# Patient Record
Sex: FEMALE | Race: BLACK OR AFRICAN AMERICAN | NOT HISPANIC OR LATINO | Employment: UNEMPLOYED | URBAN - METROPOLITAN AREA
[De-identification: names, ages, dates, MRNs, and addresses within clinical notes are randomized per-mention and may not be internally consistent; named-entity substitution may affect disease eponyms.]

---
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
## 2025-01-04 ENCOUNTER — HEALTH MAINTENANCE LETTER (OUTPATIENT)
Age: 20
End: 2025-01-04

## 2025-07-16 ENCOUNTER — TELEPHONE (OUTPATIENT)
Dept: FAMILY MEDICINE | Facility: CLINIC | Age: 20
End: 2025-07-16
Payer: COMMERCIAL

## 2025-07-16 NOTE — TELEPHONE ENCOUNTER
07/16/25    Spoke to Patient regarding excessive no-shows. Reviewed No-show policy and the importance of coming to their appointments. They have voiced understanding and have agreed to call or use their MyChart to cancel their appointments. They are aware if they continue to no-show appointments, they may only be eligible for same day appointments, if available.    Was MyChart offered to patient?  Yes, MyChart Activation code sent via text or email.     Was Text Reminders offered to patient?  Yes, Pt accepted text reminders.    Jenni Bhardwaj on 7/16/2025 at 4:18 PM

## 2025-07-17 ENCOUNTER — OFFICE VISIT (OUTPATIENT)
Dept: FAMILY MEDICINE | Facility: CLINIC | Age: 20
End: 2025-07-17
Payer: COMMERCIAL

## 2025-07-17 VITALS
BODY MASS INDEX: 48.48 KG/M2 | HEIGHT: 65 IN | OXYGEN SATURATION: 98 % | SYSTOLIC BLOOD PRESSURE: 103 MMHG | DIASTOLIC BLOOD PRESSURE: 68 MMHG | TEMPERATURE: 97.8 F | WEIGHT: 291 LBS | RESPIRATION RATE: 18 BRPM | HEART RATE: 92 BPM

## 2025-07-17 DIAGNOSIS — O00.201 RIGHT OVARIAN PREGNANCY WITHOUT INTRAUTERINE PREGNANCY: Primary | ICD-10-CM

## 2025-07-17 LAB — HCG INTACT+B SERPL-ACNC: 285 MIU/ML

## 2025-07-17 PROCEDURE — 84702 CHORIONIC GONADOTROPIN TEST: CPT

## 2025-07-17 PROCEDURE — 36415 COLL VENOUS BLD VENIPUNCTURE: CPT

## 2025-07-17 NOTE — PROGRESS NOTES
"Preceptor attestation:  Vital signs reviewed: /68   Pulse 92   Temp 97.8  F (36.6  C) (Oral)   Resp 18   Ht 1.651 m (5' 5\")   Wt 132 kg (291 lb)   LMP 05/14/2025 (Exact Date)   SpO2 98%   BMI 48.42 kg/m      Patient seen, evaluated, and discussed with the resident.  I verified the content of the note, which accurately reflects my assessment of the patient and the plan of care.    Supervising physician: Chloe Mendez MD  [facility]  "

## 2025-07-17 NOTE — PATIENT INSTRUCTIONS
-labs today  -repeat ultrasound in 2 weeks   -go to ER if you are having more belly pain, fevers, short of breath

## 2025-07-17 NOTE — PROGRESS NOTES
Assessment & Plan     There are no diagnoses linked to this encounter.        Subjective   Pamela flor is a 19 year old, presenting for the following health issues:  No chief complaint on file.         No data to display              HPI      19 year old female new to me with hx of obesity here for ED follow up. Presented  for abdominal pain. Ectopic in right ovary based on TVUS. Hcg 395 then. Undesired preg.     pt recently underwent elective  via suction D&C on  for . After procedure pt was told no fetal tissue was noted within uterus and precautions were given for ectopic pregnancy. She presented to ED  for belly pain.     Given methotrexate on .     HCG on 25 was 659. --> 395 on the .     Had some emesis day after methotrexate but this has subsided. A little sore from the injections. No abdominal pain, fever, bleeding, cramping.     No meds. LMP May 14. No hx ectopic in the past or abdominal surgery.     Review of Systems  Constitutional, HEENT, cardiovascular, pulmonary, gi and gu systems are negative, except as otherwise noted.      Objective    There were no vitals taken for this visit.  There is no height or weight on file to calculate BMI.  Physical Exam   {Exam List (Optional):254580}    {Diagnostic Test Results (Optional):386701}        Signed Electronically by: Carlos Gomez MD  {Email feedback regarding this note to [email or web address]   :[id number]}   "negative, except as otherwise noted.      Objective    /68   Pulse 92   Temp 97.8  F (36.6  C) (Oral)   Resp 18   Ht 1.651 m (5' 5\")   Wt 132 kg (291 lb)   LMP 05/14/2025 (Exact Date)   SpO2 98%   BMI 48.42 kg/m    Body mass index is 48.42 kg/m .  Physical Exam   GENERAL: alert and no distress  RESP: lungs clear to auscultation - no rales, rhonchi or wheezes  CV: regular rate and rhythm, normal S1 S2, no S3 or S4, no murmur, click or rub, no peripheral edema  ABDOMEN: soft, nontender, no hepatosplenomegaly, no masses and bowel sounds normal  MS: no gross musculoskeletal defects noted, no edema  SKIN: no suspicious lesions or rashes    Results for orders placed or performed in visit on 07/17/25   Beta-HCG Quantitative     Status: Abnormal   Result Value Ref Range    hCG Quantitative 285 (H) <5 mIU/mL             OB US 07/14/25  FINDINGS:   UTERUS: Uterus measures 7.3 x 4.2 x 3.7 cm. Endometrium measures 5 mm. No intrauterine gestational sac is identified. A small amount of fluid is seen within the endocervical canal (image 48).     RIGHT OVARY: There is an indeterminate, ovoid, centrally hypoechoic, echogenic structure in the right adnexa adjacent to the right ovary, measuring 1.0 x 1.6 x 1.2 cm (image 72). No associated internal or significant peripheral color Doppler flow.   LEFT OVARY: Normal.     Signed Electronically by: Carlos Gomez MD  {Email feedback regarding this note to [email or web address]   :[id number]}  "

## 2025-07-21 ENCOUNTER — RESULTS FOLLOW-UP (OUTPATIENT)
Dept: FAMILY MEDICINE | Facility: CLINIC | Age: 20
End: 2025-07-21

## 2025-07-21 ENCOUNTER — DOCUMENTATION ONLY (OUTPATIENT)
Dept: FAMILY MEDICINE | Facility: CLINIC | Age: 20
End: 2025-07-21

## 2025-07-21 DIAGNOSIS — O00.201 RIGHT OVARIAN PREGNANCY WITHOUT INTRAUTERINE PREGNANCY: Primary | ICD-10-CM

## 2025-07-21 NOTE — PROGRESS NOTES
Interprofessional Team Consultation Note     Requesting Provider: Dr. Tee    Consultants:  Behavioral Health: Dr. Marycruz (s)  Care Coordination: Justyn Reyna Shannon  PharmD: Pieter). Jerrod  Family Medicine Physicians: (s). Yash Tee    Identifying Data/Reason for Referral:  Patient Pamela Bland, preferred name Pamela bland, is 19 year old female who is cared for by Dr. Tee. Provider is requesting consultation related to development of care plan. Relevant clinical information obtained from requesting provider, interprofessional team members noted above, and review of the medical record. Carlos Gomez is the primary care provider assigned in Epic.    Patient Active Problem List   Diagnosis    Acute and subacute iridocyclitis, unspecified    Obesity without serious comorbidity in pediatric patient    Right ovarian pregnancy without intrauterine pregnancy     Current Outpatient Medications   Medication Sig Dispense Refill    ACETAMINOPHEN PO        No current facility-administered medications for this visit.     Topics Discussed:  This patient is new to Dr. Tee.  Will be seeing her this afternoon in follow up to recent [facility] visit and ectopic pregnancy.  Discussed psychosocial factors which may be pertinent to discussion.  No significant mental health history that we are aware of.        9/7/2018    11:05 AM 11/8/2019     5:08 PM 5/17/2021     4:53 PM   PHQ   PHQ-9 Total Score 0      Q9: Thoughts of better off dead/self-harm past 2 weeks Not at all      PHQ-A Total Score  0 0   PHQ-A Depressed most days in past year  No No   PHQ-A Mood affect on daily activities  Not difficult at all  Not difficult at all    PHQ-A Suicide Ideation past 2 weeks  Not at all Not at all   PHQ-A Suicide Ideation past month  No No   PHQ-A Previous suicide attempt  No No       Data saved with a previous flowsheet row definition       Recommendations/Action Items:  Repeat HCG - go to lab first.  Encouraged  asking about understanding of ectopic pregnancy and elicitation of any questions/worries so these can be addressed in the visit.    Rika Joel, PhD, LP     Disclaimer:  The above treatment recommendations are based on consultation with the patient's primary care provider and a review of relevant information in EPIC. I have not personally examined the patient. All recommendations should be implemented with considerations of the patient's relevant prior history and current clinical status. Please contact me with any questions about the care of this patient.

## 2025-07-21 NOTE — TELEPHONE ENCOUNTER
Pt did not come to scheduled appt. [facility] and discuss results and need for further lab appt.    SHADE Leonard, BSN

## 2025-07-30 ENCOUNTER — RESULTS FOLLOW-UP (OUTPATIENT)
Dept: FAMILY MEDICINE | Facility: CLINIC | Age: 20
End: 2025-07-30
Payer: COMMERCIAL

## 2025-07-30 DIAGNOSIS — O00.201 RIGHT OVARIAN PREGNANCY WITHOUT INTRAUTERINE PREGNANCY: Primary | ICD-10-CM

## 2025-08-06 ENCOUNTER — MYC REFILL (OUTPATIENT)
Dept: FAMILY MEDICINE | Facility: CLINIC | Age: 20
End: 2025-08-06

## 2025-08-06 ENCOUNTER — LAB (OUTPATIENT)
Dept: LAB | Facility: CLINIC | Age: 20
End: 2025-08-06
Payer: COMMERCIAL

## 2025-08-06 ENCOUNTER — OFFICE VISIT (OUTPATIENT)
Dept: FAMILY MEDICINE | Facility: CLINIC | Age: 20
End: 2025-08-06
Payer: COMMERCIAL

## 2025-08-06 VITALS
DIASTOLIC BLOOD PRESSURE: 77 MMHG | RESPIRATION RATE: 16 BRPM | WEIGHT: 293 LBS | SYSTOLIC BLOOD PRESSURE: 109 MMHG | BODY MASS INDEX: 48.82 KG/M2 | HEART RATE: 63 BPM | HEIGHT: 65 IN | OXYGEN SATURATION: 97 % | TEMPERATURE: 98.5 F

## 2025-08-06 DIAGNOSIS — O00.201 RIGHT OVARIAN PREGNANCY WITHOUT INTRAUTERINE PREGNANCY: ICD-10-CM

## 2025-08-06 DIAGNOSIS — Z30.09 BIRTH CONTROL COUNSELING: ICD-10-CM

## 2025-08-06 DIAGNOSIS — O00.201 RIGHT OVARIAN PREGNANCY WITHOUT INTRAUTERINE PREGNANCY: Primary | ICD-10-CM

## 2025-08-06 LAB — HCG INTACT+B SERPL-ACNC: 15 MIU/ML

## 2025-08-06 RX ORDER — PNV NO.95/FERROUS FUM/FOLIC AC 28MG-0.8MG
1 TABLET ORAL DAILY
Qty: 90 TABLET | Refills: 3 | Status: SHIPPED | OUTPATIENT
Start: 2025-08-06

## 2025-08-06 RX ORDER — PNV NO.95/FERROUS FUM/FOLIC AC 28MG-0.8MG
1 TABLET ORAL DAILY
Qty: 90 TABLET | Refills: 3 | OUTPATIENT
Start: 2025-08-06